# Patient Record
Sex: FEMALE | Race: WHITE | NOT HISPANIC OR LATINO
[De-identification: names, ages, dates, MRNs, and addresses within clinical notes are randomized per-mention and may not be internally consistent; named-entity substitution may affect disease eponyms.]

---

## 2017-05-25 ENCOUNTER — APPOINTMENT (OUTPATIENT)
Dept: PLASTIC SURGERY | Facility: CLINIC | Age: 72
End: 2017-05-25

## 2017-08-22 VITALS
HEART RATE: 75 BPM | DIASTOLIC BLOOD PRESSURE: 73 MMHG | WEIGHT: 190.04 LBS | RESPIRATION RATE: 16 BRPM | OXYGEN SATURATION: 97 % | SYSTOLIC BLOOD PRESSURE: 167 MMHG | TEMPERATURE: 98 F | HEIGHT: 66 IN

## 2017-08-22 NOTE — ASU PATIENT PROFILE, ADULT - PMH
Asthma  Asthma  BBB (bundle branch block)    Carotid occlusion, bilateral  intracranial  Essential hypertension  Hypertension  Gastritis and gastroduodenitis  Gastritis  Gastroesophageal reflux disease  GERD (gastroesophageal reflux disease)  Hiatal hernia    Mandibular mass    Transient ischemic attack (TIA), and cerebral infarction without residual deficits  History of TIAs

## 2017-08-22 NOTE — ASU PATIENT PROFILE, ADULT - PSH
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

## 2017-08-23 ENCOUNTER — APPOINTMENT (OUTPATIENT)
Dept: PLASTIC SURGERY | Facility: HOSPITAL | Age: 72
End: 2017-08-23

## 2017-08-23 ENCOUNTER — OUTPATIENT (OUTPATIENT)
Dept: OUTPATIENT SERVICES | Facility: HOSPITAL | Age: 72
LOS: 1 days | Discharge: HOME | End: 2017-08-23
Payer: MEDICARE

## 2017-08-23 VITALS — OXYGEN SATURATION: 97 % | HEART RATE: 72 BPM | SYSTOLIC BLOOD PRESSURE: 132 MMHG | RESPIRATION RATE: 16 BRPM

## 2017-08-23 DIAGNOSIS — Z41.9 ENCOUNTER FOR PROCEDURE FOR PURPOSES OTHER THAN REMEDYING HEALTH STATE, UNSPECIFIED: Chronic | ICD-10-CM

## 2017-08-23 DIAGNOSIS — R22.0 LOCALIZED SWELLING, MASS AND LUMP, HEAD: Chronic | ICD-10-CM

## 2017-08-23 PROCEDURE — 40840 RECONSTRUCTION OF MOUTH: CPT

## 2017-08-23 PROCEDURE — 86901 BLOOD TYPING SEROLOGIC RH(D): CPT

## 2017-08-23 PROCEDURE — 86850 RBC ANTIBODY SCREEN: CPT

## 2017-08-23 PROCEDURE — 86900 BLOOD TYPING SEROLOGIC ABO: CPT

## 2017-08-23 PROCEDURE — C1889: CPT

## 2017-08-23 PROCEDURE — 14041 TIS TRNFR F/C/C/M/N/A/G/H/F: CPT

## 2017-08-23 RX ORDER — AMPICILLIN SODIUM AND SULBACTAM SODIUM 250; 125 MG/ML; MG/ML
3 INJECTION, POWDER, FOR SUSPENSION INTRAMUSCULAR; INTRAVENOUS ONCE
Qty: 0 | Refills: 0 | Status: DISCONTINUED | OUTPATIENT
Start: 2017-08-23 | End: 2017-08-23

## 2017-08-23 RX ORDER — DULOXETINE HYDROCHLORIDE 30 MG/1
1 CAPSULE, DELAYED RELEASE ORAL
Qty: 0 | Refills: 0 | COMMUNITY

## 2017-08-23 RX ORDER — PANTOPRAZOLE SODIUM 20 MG/1
1 TABLET, DELAYED RELEASE ORAL
Qty: 0 | Refills: 0 | COMMUNITY

## 2017-08-23 RX ORDER — ALBUTEROL 90 UG/1
2 AEROSOL, METERED ORAL
Qty: 0 | Refills: 0 | COMMUNITY

## 2017-08-23 RX ORDER — CHLORHEXIDINE GLUCONATE 213 G/1000ML
5 SOLUTION TOPICAL
Qty: 1 | Refills: 0 | OUTPATIENT
Start: 2017-08-23 | End: 2017-08-30

## 2017-08-23 NOTE — BRIEF OPERATIVE NOTE - PROCEDURE
Debulking of free flap  08/23/2017    Active  ESTELLA  Liposuction  08/23/2017    Active  ESTELLA  Endosteal implant  08/23/2017    Active  LORIIDES

## 2017-08-29 ENCOUNTER — APPOINTMENT (OUTPATIENT)
Dept: PLASTIC SURGERY | Facility: CLINIC | Age: 72
End: 2017-08-29

## 2017-08-30 DIAGNOSIS — C41.1 MALIGNANT NEOPLASM OF MANDIBLE: ICD-10-CM

## 2017-11-30 ENCOUNTER — APPOINTMENT (OUTPATIENT)
Dept: PLASTIC SURGERY | Facility: CLINIC | Age: 72
End: 2017-11-30
Payer: MEDICARE

## 2017-11-30 VITALS — HEIGHT: 66 IN | BODY MASS INDEX: 29.25 KG/M2 | WEIGHT: 182 LBS

## 2017-11-30 PROCEDURE — 99212 OFFICE O/P EST SF 10 MIN: CPT

## 2023-12-04 NOTE — BRIEF OPERATIVE NOTE - CO SURGEON
----- Message from Tamela Rodriguez MD sent at 11/30/2023  4:12 PM CST -----  Sinusitis of sinuses of left side of face, abx treatment already ordered should help.  Visit with ENT.   Jordan Gomez MD